# Patient Record
Sex: FEMALE | URBAN - METROPOLITAN AREA
[De-identification: names, ages, dates, MRNs, and addresses within clinical notes are randomized per-mention and may not be internally consistent; named-entity substitution may affect disease eponyms.]

---

## 2022-06-14 ENCOUNTER — APPOINTMENT (OUTPATIENT)
Dept: URBAN - METROPOLITAN AREA CLINIC 274 | Age: 42
Setting detail: DERMATOLOGY
End: 2022-06-15

## 2022-06-14 DIAGNOSIS — L65.9 NONSCARRING HAIR LOSS, UNSPECIFIED: ICD-10-CM

## 2022-06-14 DIAGNOSIS — L81.4 OTHER MELANIN HYPERPIGMENTATION: ICD-10-CM

## 2022-06-14 DIAGNOSIS — L29.8 OTHER PRURITUS: ICD-10-CM

## 2022-06-14 PROCEDURE — 99203 OFFICE O/P NEW LOW 30 MIN: CPT | Mod: 25

## 2022-06-14 PROCEDURE — 11900 INJECT SKIN LESIONS </W 7: CPT

## 2022-06-14 PROCEDURE — OTHER MIPS QUALITY: OTHER

## 2022-06-14 PROCEDURE — OTHER COUNSELING: OTHER

## 2022-06-14 PROCEDURE — OTHER PRODUCT LINE (ZO SKIN HEALTH): OTHER

## 2022-06-14 PROCEDURE — OTHER PHOTO-DOCUMENTATION: OTHER

## 2022-06-14 PROCEDURE — OTHER PRESCRIPTION: OTHER

## 2022-06-14 PROCEDURE — OTHER INTRALESIONAL KENALOG: OTHER

## 2022-06-14 PROCEDURE — OTHER OTHER: OTHER

## 2022-06-14 RX ORDER — CLOBETASOL PROPIONATE 0.5 MG/ML
SOLUTION TOPICAL
Qty: 50 | Refills: 2 | Status: ERX | COMMUNITY
Start: 2022-06-14

## 2022-06-14 RX ORDER — SPIRONOLACTONE 100 MG/1
TABLET, FILM COATED ORAL
Qty: 30 | Refills: 11 | Status: ERX | COMMUNITY
Start: 2022-06-14

## 2022-06-14 ASSESSMENT — LOCATION SIMPLE DESCRIPTION DERM
LOCATION SIMPLE: SCALP
LOCATION SIMPLE: LEFT SCALP
LOCATION SIMPLE: RIGHT FOREHEAD
LOCATION SIMPLE: HAIR
LOCATION SIMPLE: RIGHT SCALP
LOCATION SIMPLE: LEFT FOREHEAD

## 2022-06-14 ASSESSMENT — LOCATION DETAILED DESCRIPTION DERM
LOCATION DETAILED: RIGHT SUPERIOR MEDIAL FOREHEAD
LOCATION DETAILED: HAIR
LOCATION DETAILED: LEFT MEDIAL FRONTAL SCALP
LOCATION DETAILED: RIGHT SUPERIOR PARIETAL SCALP
LOCATION DETAILED: RIGHT CENTRAL FRONTAL SCALP
LOCATION DETAILED: LEFT SUPERIOR FOREHEAD

## 2022-06-14 ASSESSMENT — LOCATION ZONE DERM
LOCATION ZONE: FACE
LOCATION ZONE: SCALP

## 2022-06-14 ASSESSMENT — SEVERITY ASSESSMENT: SEVERITY: MODERATE

## 2022-06-14 NOTE — HPI: DISCOLORATION (HYPERPIGMENTATION)
Is This A New Presentation, Or A Follow-Up?: Discoloration
How Severe Is It?: mild
Additional History: Patient reports dark spots on face.

## 2022-06-14 NOTE — PROCEDURE: PRODUCT LINE ([facility])
Product 27 Price (In Dollars - Numeric Only, No Special Characters Or $): 0.00
Product 35 Units: 0
Allow Plan To Count Towards E/M Coding: No (this will remove associated impression from E/M calculation)
Product 2 Application Directions: Apply to face am/pm
Name Of Product 7: Daily SHEER sunscreen
Detail Level: Zone
Product 6 Price (In Dollars - Numeric Only, No Special Characters Or $): 90.00
Product 2 Price (In Dollars - Numeric Only, No Special Characters Or $): 41.00
Product 5 Units: 1
Product 7 Application Directions: Apply to face every 2hrs during the day.
Assigning Risk Information: Per AMA, level of risk is based upon consequences of the problem(s) addressed at the encounter when appropriately treated. Risk also includes medical decision making related to the need to initiate or forego further testing, treatment and/or hospitalization. Over the counter medication are assigned a risk level of low. Prescription medication management is assigned a risk level of moderate.
Product 1 Price (In Dollars - Numeric Only, No Special Characters Or $): 213.00
Name Of Product 2: Rozatrol
Product 5 Application Directions: Apply to face am and pm
Name Of Product 4: Toner
Name Of Product 5: Pigment Control Crème
Product 5 Price (In Dollars - Numeric Only, No Special Characters Or $): 70.00
Product 4 Price (In Dollars - Numeric Only, No Special Characters Or $): 14.50
Product 3 Price (In Dollars - Numeric Only, No Special Characters Or $): 124.00
Render Product Pricing In Note: Yes
Name Of Product 1: Anti-aging kit
Name Of Product 3: Daily skin kit
Product 6 Application Directions: Apply to face at night
Risk Of Complication Category: No MDM
Name Of Product 6: Wrinkle + Texture Repair

## 2022-06-14 NOTE — PROCEDURE: INTRALESIONAL KENALOG
Total Volume Injected (Ccs- Only Use Numbers And Decimals): 3cc
X Size Of Lesion In Cm (Optional): 0
Ndc# For Kenalog Only: TU
Treatment Number (Optional): 1
Medical Necessity Clause: This procedure was medically necessary because the lesions that were treated were:
Consent: The risks of atrophy were reviewed with the patient.
Validate Note Data When Using Inventory: Yes
Detail Level: Detailed
Concentration Of Solution Injected (Mg/Ml): 3.0
Kenalog Preparation: Kenalog
Include Z78.9 (Other Specified Conditions Influencing Health Status) As An Associated Diagnosis?: No

## 2022-06-14 NOTE — PROCEDURE: OTHER
Detail Level: Zone
Other (Free Text): Discussed biopsy to confirm type of alopecia she would like to wait.
Render Risk Assessment In Note?: no
Note Text (......Xxx Chief Complaint.): This diagnosis correlates with the

## 2022-06-14 NOTE — HPI: HAIR LOSS (PATIENT REPORTED)
Where Is Your Hair Loss Located?: Head
Please Specify Dates Of Recent Illness, Surgery, Weighloss, Childbirth, Or Increased Stress:: Covid in December
Additional Comments (Use Complete Sentences): Reports hair loss was at its worst when she had covid in December. She reports PCP has done lab work in the past but everything was normal.